# Patient Record
Sex: FEMALE | Race: BLACK OR AFRICAN AMERICAN | NOT HISPANIC OR LATINO | URBAN - NONMETROPOLITAN AREA
[De-identification: names, ages, dates, MRNs, and addresses within clinical notes are randomized per-mention and may not be internally consistent; named-entity substitution may affect disease eponyms.]

---

## 2017-05-10 NOTE — PATIENT DISCUSSION
TRICHIASIS, OS: LASHES EPILATED WITHOUT DIFFICULTY FROM __LEFT LOWER___LID. IF RECURRENT WILL CONSIDER REFERRAL FOR SURGICAL TREATMENT.

## 2017-05-10 NOTE — PATIENT DISCUSSION
Surgery Counseling: I have discussed the option of scheduling surgery versus following, as well as the risks, benefits and alternatives of cataract surgery with the patient. It was explained that the surgery is medically indicated at this time, and it can be performed at the patient's option as delaying will cause no further deterioration, therefore there is no rush and there is no harm in waiting to have surgery. It was also explained that there is no guarantee that removing the cataract will improve their vision. The patient understands and desires to proceed with cataract surgery with the implantation of an intraocular lens to improve vision for __watching tv and reading__. I have given the patient the prescribed regimen of the all-in-one drop to use before and after cataract surgery. They have elected to use the all-in-one option of Pred/Gati/Nepaf(prednisolone acetate,gatifloxacin,and nepafenac. Patient to administer as directed.

## 2018-08-09 PROBLEM — H25.13: Noted: 2018-08-09

## 2018-08-09 PROBLEM — H52.03: Noted: 2018-08-09

## 2018-08-09 PROBLEM — H52.4: Noted: 2018-08-09

## 2020-02-13 ENCOUNTER — IMPORTED ENCOUNTER (OUTPATIENT)
Dept: URBAN - NONMETROPOLITAN AREA CLINIC 1 | Facility: CLINIC | Age: 60
End: 2020-02-13

## 2020-02-13 PROCEDURE — 92014 COMPRE OPH EXAM EST PT 1/>: CPT

## 2020-02-13 PROCEDURE — 92015 DETERMINE REFRACTIVE STATE: CPT

## 2020-02-13 NOTE — PATIENT DISCUSSION
Presyopia OU- Discussed diagnosis in detail with patient- New glasses RX given today- Patient has never been dilated here would like to dilated at next visit or would like [facility] - Continue to monitor- RTC 1 year complete Cataracts OU- Discussed diagnosis in detail with patient- Discussed signs and symptoms of progression- Discussed UV protection- No treatment needed at this time - Continue to monitor

## 2020-08-26 NOTE — PATIENT DISCUSSION
TRICHIASIS, 2__: LASHES EPILATED WITHOUT DIFFICULTY FROM __LUL___LID. IF RECURRENT WILL CONSIDER REFERRAL FOR SURGICAL TREATMENT.

## 2020-08-26 NOTE — PATIENT DISCUSSION
Trichiasis Counseling: The diagnosis of trichiasis was explained to the patient. The possibility of the recurrence of the problem leading to permanent damage to the cornea and vision loss was also explained. The risks, benefits and alternatives of epilation were reviewed with the patient. The patient understands and wishes to proceed with epliation by forceps today.

## 2021-08-10 NOTE — PATIENT DISCUSSION
New Prescription: erythromycin (erythromycin): ointment: 5 mg/gram (0.5 %) a small amount at bedtime on eyelid 08-

## 2021-08-10 NOTE — PATIENT DISCUSSION
TRICHIASIS, 2__: LASHES EPILATED WITHOUT DIFFICULTY FROM _LUL__. IF RECURRENT WILL CONSIDER REFERRAL FOR SURGICAL TREATMENT.

## 2022-04-09 ASSESSMENT — VISUAL ACUITY
OD_SC: 20/20-1
OS_SC: 20/20-1

## 2022-04-09 ASSESSMENT — TONOMETRY
OD_IOP_MMHG: 19
OS_IOP_MMHG: 17

## 2022-06-15 ENCOUNTER — ESTABLISHED PATIENT (OUTPATIENT)
Dept: URBAN - NONMETROPOLITAN AREA CLINIC 1 | Facility: CLINIC | Age: 62
End: 2022-06-15

## 2022-06-15 DIAGNOSIS — H52.4: ICD-10-CM

## 2022-06-15 PROCEDURE — 92014 COMPRE OPH EXAM EST PT 1/>: CPT

## 2022-06-15 PROCEDURE — 92015 DETERMINE REFRACTIVE STATE: CPT

## 2022-06-15 ASSESSMENT — TONOMETRY
OD_IOP_MMHG: 15
OS_IOP_MMHG: 18

## 2022-06-15 ASSESSMENT — VISUAL ACUITY
OD_CC: 20/25
OS_CC: 20/25

## 2022-06-15 NOTE — PATIENT DISCUSSION
Pt defers dilation, explained that I am unable to view the macula and retina thoroughly without this. Explained extensively w/ pt.

## 2022-06-15 NOTE — PATIENT DISCUSSION
Patient has never been dilated with us before, stressed importance for thorough exam. She refused to use drops of any kind in the past either, use [employer] if needed.

## 2022-09-06 NOTE — PROCEDURE NOTE: CLINICAL
PROCEDURE NOTE: Epilation #2 Left Upper Lid. Diagnosis: Trichiasis without Entropion. Anesthesia: Topical. Prior to treatment, the risks/benefits/alternatives were discussed. The patient wished to proceed with procedure. Aberrant lashes removed from * lid(s) using microforcep. Patient tolerated procedure well. There were no complications. Post-op instructions given. Cami Joshua